# Patient Record
Sex: MALE | Race: WHITE | URBAN - METROPOLITAN AREA
[De-identification: names, ages, dates, MRNs, and addresses within clinical notes are randomized per-mention and may not be internally consistent; named-entity substitution may affect disease eponyms.]

---

## 2020-11-17 ENCOUNTER — OFFICE VISIT (OUTPATIENT)
Dept: URBAN - METROPOLITAN AREA CLINIC 45 | Facility: CLINIC | Age: 80
End: 2020-11-17
Payer: MEDICARE

## 2020-11-17 DIAGNOSIS — H40.1134 PRIMARY OPEN-ANGLE GLAUCOMA, BILATERAL, INDETERMINATE STAGE: ICD-10-CM

## 2020-11-17 PROCEDURE — 76514 ECHO EXAM OF EYE THICKNESS: CPT | Performed by: OPHTHALMOLOGY

## 2020-11-17 PROCEDURE — 92133 CPTRZD OPH DX IMG PST SGM ON: CPT | Performed by: OPHTHALMOLOGY

## 2020-11-17 PROCEDURE — 92004 COMPRE OPH EXAM NEW PT 1/>: CPT | Performed by: OPHTHALMOLOGY

## 2020-11-17 RX ORDER — OFLOXACIN 3 MG/ML
0.3 % SOLUTION/ DROPS OPHTHALMIC
Qty: 5 | Refills: 1 | Status: ACTIVE
Start: 2020-11-17

## 2020-11-17 RX ORDER — LATANOPROST 50 UG/ML
0.005 % SOLUTION OPHTHALMIC
Qty: 1 | Refills: 11 | Status: ACTIVE
Start: 2020-11-17

## 2020-11-17 RX ORDER — LOTEPREDNOL ETABONATE 3.8 MG/G
0.38 % GEL OPHTHALMIC
Qty: 1 | Refills: 2 | Status: ACTIVE
Start: 2020-11-17

## 2020-11-17 ASSESSMENT — INTRAOCULAR PRESSURE
OD: 31
OS: 36

## 2020-11-20 NOTE — IMPRESSION/PLAN
Impression: Combined forms of age-related cataract, left eye: H25.812. Plan: OK for ce/iol OS with [facility] in [address] 27, RL2. Distance target. Discussed lens options, Toric/Trifocal. Discussed ORA. Pt is a candidate for premium lens. Discussed need for glasses for near vision with standard IOL and possibly Trifocal as well. Discussed diagnosis of cataracts. Cataracts are limiting vision. Discussed risks, benefits and alternatives to surgery including but not limited to: bleeding, infection, risk of vision loss, loss of the eye, need for other surgery. Patient voiced understanding and wishes to proceed.

## 2020-11-23 NOTE — IMPRESSION/PLAN
Impression: Primary open-angle glaucoma, bilateral, indeterminate stage: H40.1134. /595, 11/20 OCT 90/- 8/- Plan: Discussed glaucoma and risk of vision loss without treatment and close follow-up. Discussed risks/benefits/alternatives to treatment. IOP too high for optic nerve appearance. OCT RNFL ordered and reviewed with pt. Start Latanoprost QHS OU (medication sent to pharmacy) Due to advance cataract obtain HVF after ce/iol. Recommend OMNI OS (if unable due to insurance coverage, second choice iStent). Discussed glaucoma and cataracts. Discussed risks, benefits and alternatives to MIGS procedure. Pt voiced understanding and desires to proceed.

## 2020-12-07 ENCOUNTER — TESTING ONLY (OUTPATIENT)
Dept: URBAN - METROPOLITAN AREA CLINIC 45 | Facility: CLINIC | Age: 80
End: 2020-12-07
Payer: MEDICARE

## 2020-12-07 DIAGNOSIS — H25.812 COMBINED FORMS OF AGE-RELATED CATARACT, LEFT EYE: ICD-10-CM

## 2020-12-07 PROCEDURE — 92083 EXTENDED VISUAL FIELD XM: CPT | Performed by: OPHTHALMOLOGY

## 2020-12-07 PROCEDURE — 92136 OPHTHALMIC BIOMETRY: CPT | Performed by: OPHTHALMOLOGY

## 2020-12-07 PROCEDURE — 92025 CPTRIZED CORNEAL TOPOGRAPHY: CPT | Performed by: OPHTHALMOLOGY

## 2020-12-07 ASSESSMENT — VISUAL ACUITY: OS: 20/200

## 2020-12-07 ASSESSMENT — PACHYMETRY
OD: 23.87
OS: 22.96